# Patient Record
Sex: FEMALE | Race: WHITE
[De-identification: names, ages, dates, MRNs, and addresses within clinical notes are randomized per-mention and may not be internally consistent; named-entity substitution may affect disease eponyms.]

---

## 2017-11-29 NOTE — RAD
EXAM: MAMMO KANG SCREENING BILATERAL.



HISTORY: Screening.



COMPARISON: Breast mammogram from 2016 and 2015.



FINDINGS:



2-D and 3-D tomosynthesis mammograms were obtained of both breasts in the CC

and MLO projections. Computer-aided detection (CAD) was utilized.



Breast density category C: The breasts are heterogeneously dense which may

obscure small masses.

The nipples and skin are within normal limits. Stable bilateral round masses

are seen dating back to 2015.

No suspicious calcifications, spiculated masses or areas of architectural

distortion.



IMPRESSION: No mammographic evidence of malignancy. Stable mammogram.



BI-RADS CATEGORY: 2 BENIGN FINDING



RECOMMENDED FOLLOW-UP: 12M 12 MONTH FOLLOW-UP



PQRS compliance statement: Patient information was entered into a reminder

system with a target due date  11/29/2018 for the next mammogram.



Mammography is a sensitive method for finding small breast cancers, but it

does not detect them all and is not a substitute for careful clinical

examination.  A negative mammogram does not negate a clinically suspicious

finding and should not result in delay in biopsying a clinically suspicious

abnormality.



"Our facility is accredited by the American [employer] Mammography

Program."

## 2020-11-10 ENCOUNTER — HOSPITAL ENCOUNTER (OUTPATIENT)
Dept: HOSPITAL 63 - PMG | Age: 65
End: 2020-11-10
Attending: PHYSICIAN ASSISTANT
Payer: MEDICARE

## 2020-11-10 DIAGNOSIS — M25.762: ICD-10-CM

## 2020-11-10 DIAGNOSIS — M17.0: Primary | ICD-10-CM

## 2020-11-10 DIAGNOSIS — M25.761: ICD-10-CM

## 2020-11-10 PROCEDURE — 73560 X-RAY EXAM OF KNEE 1 OR 2: CPT

## 2020-11-10 NOTE — RAD
EXAM: KNEE BILAT 2V 11/10/2020 12:00 AM

 

CLINICAL INDICATION:Status post fall

 

COMPARISON:None

 

TECHNIQUE:Standing AP view of the knees and oblique and lateral view of 

the right and left knee

 

FINDINGS:

 

Right knee: No acute fracture. Alignment is normal. Joint spaces are 

maintained. There are small lateral and patellofemoral osteophytes. No 

joint effusion or soft tissue abnormality.

 

Left knee: No acute fracture. Alignment is normal. Joint spaces are 

maintained. There are tiny patellofemoral osteophytes. No joint effusion.

 

IMPRESSION:

1. No acute osseous abnormality.

 

2. Mild bilateral degenerative joint disease.

 

Electronically signed by: Angle Camara MD (11/10/2020 4:56 PM) [id number]

## 2020-11-17 ENCOUNTER — HOSPITAL ENCOUNTER (OUTPATIENT)
Dept: HOSPITAL 63 - LAB | Age: 65
End: 2020-11-17
Attending: PHYSICIAN ASSISTANT
Payer: MEDICARE

## 2020-11-17 DIAGNOSIS — U07.1: Primary | ICD-10-CM

## 2020-11-17 PROCEDURE — U0003 INFECTIOUS AGENT DETECTION BY NUCLEIC ACID (DNA OR RNA); SEVERE ACUTE RESPIRATORY SYNDROME CORONAVIRUS 2 (SARS-COV-2) (CORONAVIRUS DISEASE [COVID-19]), AMPLIFIED PROBE TECHNIQUE, MAKING USE OF HIGH THROUGHPUT TECHNOLOGIES AS DESCRIBED BY CMS-2020-01-R: HCPCS

## 2020-12-07 ENCOUNTER — HOSPITAL ENCOUNTER (OUTPATIENT)
Dept: HOSPITAL 63 - MAMMO | Age: 65
End: 2020-12-07
Attending: PHYSICIAN ASSISTANT
Payer: MEDICARE

## 2020-12-07 DIAGNOSIS — Z12.31: Primary | ICD-10-CM

## 2020-12-07 PROCEDURE — 77063 BREAST TOMOSYNTHESIS BI: CPT

## 2020-12-07 PROCEDURE — 77067 SCR MAMMO BI INCL CAD: CPT

## 2021-01-06 ENCOUNTER — HOSPITAL ENCOUNTER (OUTPATIENT)
Dept: HOSPITAL 63 - DXRAD | Age: 66
End: 2021-01-06
Attending: FAMILY MEDICINE
Payer: MEDICARE

## 2021-01-06 DIAGNOSIS — M89.9: ICD-10-CM

## 2021-01-06 DIAGNOSIS — M81.8: Primary | ICD-10-CM

## 2021-01-06 PROCEDURE — 77080 DXA BONE DENSITY AXIAL: CPT

## 2021-01-06 NOTE — RAD
EXAM: DUAL ENERGY X-RAY ABSORPTIOMETRY (DEXA).



HISTORY: Postmenopausal screening. Osteoporosis.



FINDINGS: The lowest measured T-score is -1.3 in the right hip, based on a bone mineral density of 0.
791 g/cm^2. Refer to the worksheets for full detail.



There has been a 0.5 percent decrease in density of the right hip and 8.2 percent increase in density
 of the lumbar spine compared to a study performed 10/30/2013.



IMPRESSION:

1. Low bone mass. Bone mineral density yields a T-score between -1.0 and -2.5. Fracture risk is incre
ased.

2. FRAX report: Not calculated.





METHODOLOGY: Dual energy x-ray absorptiometry was performed to measure bone mineral density. The foll
owing analysis is based on the 2019 Official Positions of the International Society for Clinical Dens
itometry: 



Measurements of the hips and the average of L1-L4 are preferred. When the spine and/or hip cannot be 
feasibly measured or interpreted, or in the setting of hyperparathyroidism, distal radial bone minera
l density may be measured. 



The lumbar spine T-score is based on the average bone mineral density of L1-L4. In the setting of art
ifact or anatomic abnormality, some lumbar levels may be excluded, and the remaining levels used for 
calculation. A single lumbar level is not used for diagnosis, and if only a single level is available
 for assessment, another anatomic site will be used to assign a diagnosis.



The hip T-score is based on the bone mineral density measurement of the femoral neck or total proxima
l femur of either side, whichever is lowest. Bilateral mean values are not used for diagnosis.



The forearm T-score is derived from 33% of the distal radius of the nondominant forearm.



Electronically signed by: Nita Jerry MD (1/6/2021 2:56 PM) [id number]

## 2021-06-29 ENCOUNTER — HOSPITAL ENCOUNTER (EMERGENCY)
Dept: HOSPITAL 63 - ER | Age: 66
Discharge: HOME | End: 2021-06-29
Payer: MEDICARE

## 2021-06-29 VITALS
SYSTOLIC BLOOD PRESSURE: 138 MMHG | SYSTOLIC BLOOD PRESSURE: 138 MMHG | SYSTOLIC BLOOD PRESSURE: 138 MMHG | DIASTOLIC BLOOD PRESSURE: 84 MMHG | DIASTOLIC BLOOD PRESSURE: 84 MMHG | DIASTOLIC BLOOD PRESSURE: 84 MMHG

## 2021-06-29 VITALS — BODY MASS INDEX: 24.59 KG/M2 | WEIGHT: 162.26 LBS | HEIGHT: 68 IN

## 2021-06-29 DIAGNOSIS — I10: ICD-10-CM

## 2021-06-29 DIAGNOSIS — E78.00: ICD-10-CM

## 2021-06-29 DIAGNOSIS — R42: Primary | ICD-10-CM

## 2021-06-29 DIAGNOSIS — Z88.5: ICD-10-CM

## 2021-06-29 DIAGNOSIS — R51.9: ICD-10-CM

## 2021-06-29 LAB
ALBUMIN SERPL-MCNC: 4.2 G/DL (ref 3.4–5)
ALBUMIN/GLOB SERPL: 1.2 {RATIO} (ref 1–1.7)
ALP SERPL-CCNC: 63 U/L (ref 46–116)
ALT SERPL-CCNC: 34 U/L (ref 14–59)
ANION GAP SERPL CALC-SCNC: 10 MMOL/L (ref 6–14)
APTT PPP: YELLOW S
AST SERPL-CCNC: 20 U/L (ref 15–37)
BACTERIA #/AREA URNS HPF: 0 /HPF
BASOPHILS # BLD AUTO: 0 X10^3/UL (ref 0–0.2)
BASOPHILS NFR BLD: 1 % (ref 0–3)
BILIRUB SERPL-MCNC: 0.3 MG/DL (ref 0.2–1)
BILIRUB UR QL STRIP: (no result)
BUN/CREAT SERPL: 24 (ref 6–20)
CA-I SERPL ISE-MCNC: 19 MG/DL (ref 7–20)
CALCIUM SERPL-MCNC: 9.7 MG/DL (ref 8.5–10.1)
CHLORIDE SERPL-SCNC: 106 MMOL/L (ref 98–107)
CO2 SERPL-SCNC: 26 MMOL/L (ref 21–32)
CREAT SERPL-MCNC: 0.8 MG/DL (ref 0.6–1)
EOSINOPHIL NFR BLD: 0.1 X10^3/UL (ref 0–0.7)
EOSINOPHIL NFR BLD: 1 % (ref 0–3)
ERYTHROCYTE [DISTWIDTH] IN BLOOD BY AUTOMATED COUNT: 13.5 % (ref 11.5–14.5)
FIBRINOGEN PPP-MCNC: CLEAR MG/DL
GFR SERPLBLD BASED ON 1.73 SQ M-ARVRAT: 72 ML/MIN
GLOBULIN SER-MCNC: 3.5 G/DL (ref 2.2–3.8)
GLUCOSE SERPL-MCNC: 99 MG/DL (ref 70–99)
GLUCOSE UR STRIP-MCNC: (no result) MG/DL
HCT VFR BLD CALC: 38.5 % (ref 36–47)
HGB BLD-MCNC: 13 G/DL (ref 12–15.5)
LYMPHOCYTES # BLD: 1.3 X10^3/UL (ref 1–4.8)
LYMPHOCYTES NFR BLD AUTO: 17 % (ref 24–48)
MCH RBC QN AUTO: 32 PG (ref 25–35)
MCHC RBC AUTO-ENTMCNC: 34 G/DL (ref 31–37)
MCV RBC AUTO: 96 FL (ref 79–100)
MONO #: 0.5 X10^3/UL (ref 0–1.1)
MONOCYTES NFR BLD: 6 % (ref 0–9)
NEUT #: 5.7 X10^3UL (ref 1.8–7.7)
NEUTROPHILS NFR BLD AUTO: 76 % (ref 31–73)
NITRITE UR QL STRIP: (no result)
PLATELET # BLD AUTO: 317 X10^3/UL (ref 140–400)
POTASSIUM SERPL-SCNC: 4.3 MMOL/L (ref 3.5–5.1)
PROT SERPL-MCNC: 7.7 G/DL (ref 6.4–8.2)
RBC # BLD AUTO: 4.01 X10^6/UL (ref 3.5–5.4)
RBC #/AREA URNS HPF: 0 /HPF (ref 0–2)
SODIUM SERPL-SCNC: 142 MMOL/L (ref 136–145)
SP GR UR STRIP: 1.01
SQUAMOUS #/AREA URNS LPF: (no result) /LPF
UROBILINOGEN UR-MCNC: 0.2 MG/DL
WBC # BLD AUTO: 7.6 X10^3/UL (ref 4–11)
WBC #/AREA URNS HPF: (no result) /HPF (ref 0–4)

## 2021-06-29 PROCEDURE — 96360 HYDRATION IV INFUSION INIT: CPT

## 2021-06-29 PROCEDURE — 99285 EMERGENCY DEPT VISIT HI MDM: CPT

## 2021-06-29 PROCEDURE — 36415 COLL VENOUS BLD VENIPUNCTURE: CPT

## 2021-06-29 PROCEDURE — 85025 COMPLETE CBC W/AUTO DIFF WBC: CPT

## 2021-06-29 PROCEDURE — 81001 URINALYSIS AUTO W/SCOPE: CPT

## 2021-06-29 PROCEDURE — 70450 CT HEAD/BRAIN W/O DYE: CPT

## 2021-06-29 PROCEDURE — 93005 ELECTROCARDIOGRAM TRACING: CPT

## 2021-06-29 PROCEDURE — 80053 COMPREHEN METABOLIC PANEL: CPT

## 2021-06-29 NOTE — RAD
EXAM: CT head without contrast



INDICATION: Dizziness



COMPARISON: None



TECHNIQUE: Axial CT imaging through the head without intravenous contrast.



One or more of the following individualized dose reduction techniques were utilized for this examinat
ion:  



1. Automated exposure control

2. Adjustment of the mA and/or kV according to patient size

3. Use of iterative reconstruction technique.



FINDINGS:



The ventricles and sulci are normal. Grey-white matter differentiation is maintained. There is no int
racranial hemorrhage, acute infarct, or mass lesion. Basal cisterns are clear. The skull and scalp ar
e intact. Paranasal sinuses and mastoid air cells are clear. Globes and orbits are intact.



IMPRESSION: No acute intracranial abnormality.



Electronically signed by: Angle Camara MD (6/29/2021 3:03 PM) [id number]

## 2021-06-29 NOTE — EKG
[facility] [address], [place] [address]

Test Date:    2021               Test Time:    14:14:20

Pat Name:     LEAANN WEILBACHER        Department:   

Patient ID:   [id number]           Room:          

Gender:       F                        Technician:   JOSE

:          1955               Requested By: VERITO MARTE

Order Number: [id number]            Reading MD:     

                                 Measurements

Intervals                              Axis          

Rate:         83                       P:            41

OK:           192                      QRS:          25

QRSD:         136                      T:            128

QT:           416                                    

QTc:          495                                    

                           Interpretive Statements

SINUS RHYTHM

ATRIAL PREMATURE COMPLEX(ES)

LEFT BUNDLE BRANCH BLOCK

ABNORMAL ECG

RI6.02

No previous ECG available for comparison

## 2021-06-29 NOTE — PHYS DOC
Past History


Past Medical History:  High Cholesterol, Hypertension


Past Surgical History:  Other


Additional Past Surgical Histo:  hernia repair; varicose vein repair in bilat 

legs


Alcohol Use:  None





General Adult


EDM:


Chief Complaint:  DIZZY/LIGHT HEADED





HPI:


HPI:





Patient is a 65-year-old female presents with dizziness since Saturday.  Patient

states "I just feel kind of floaty in my head".  "I kind of felt like I have a 

buzz".  Patient states that normally symptoms resolve when she sits down today 

they did not.  Patient is also reporting a headache and states that pain is 

worse with lying down.  Denies taking anything for headache prior to arrival.  

Denies chest pain or shortness of breath.  Denies nausea/vomiting/diarrhea.  

Denies trouble with balance.  Neuro exam is negative.  Patient has history of 

hypertension, high cholesterol





Review of Systems:


Review of Systems:


Constitutional:  Denies fever or chills 


Eyes:  Denies change in visual acuity 


HENT:  Denies nasal congestion or sore throat 


Respiratory:  Denies cough or shortness of breath 


Cardiovascular:  Denies chest pain or edema 


GI:  Denies abdominal pain, nausea, vomiting, bloody stools or diarrhea 


: Denies dysuria 


Musculoskeletal:  Denies back pain or joint pain 


Integument:  Denies rash 


Neurologic: Reports headache, dizziness.  Denies focal weakness or sensory 

changes 


Endocrine:  Denies polyuria or polydipsia 


Lymphatic:  Denies swollen glands 


Psychiatric:  Denies depression or anxiety





Allergies:


Allergies:





Allergies








Coded Allergies Type Severity Reaction Last Updated Verified


 


  hydrocodone Allergy Mild n/V 9/5/19 Yes











Physical Exam:


PE:





Constitutional: Well developed, well nourished, no acute distress, non-toxic 

appearance. []


HENT: Normocephalic, atraumatic, bilateral external ears normal, oropharynx 

moist, no oral exudates, nose normal. []


Eyes: PERRLA, EOMI, conjunctiva normal, no discharge. [] 


Neck: Normal range of motion, no tenderness, supple, no stridor. [] 


Cardiovascular:Heart rate regular rhythm, no murmur []


Lungs & Thorax:  Bilateral breath sounds clear to auscultation []


Abdomen: Bowel sounds normal, soft, no tenderness, no masses, no pulsatile 

masses. [] 


Skin: Warm, dry, no erythema, no rash. [] 


Back: No tenderness, no CVA tenderness. [] 


Extremities: No tenderness, no cyanosis, no clubbing, ROM intact, no edema. [] 


Neurologic: Alert and oriented X 3, normal motor function, normal sensory 

function, no focal deficits noted. []


Psychologic: Affect normal, judgement normal, mood normal. []





Current Patient Data:


Vital Signs:





                                   Vital Signs








  Date Time  Temp Pulse Resp B/P (MAP) Pulse Ox O2 Delivery O2 Flow Rate FiO2


 


6/29/21 14:03  80 16 138/84 (102) 97   











Radiology/Procedures:


Radiology/Procedures:


[]EXAM: CT head without contrast





INDICATION: Dizziness





COMPARISON: None





TECHNIQUE: Axial CT imaging through the head without intravenous contrast.





One or more of the following individualized dose reduction techniques were 

utilized for this examination:  





1. Automated exposure control


2. Adjustment of the mA and/or kV according to patient size


3. Use of iterative reconstruction technique.





FINDINGS:





The ventricles and sulci are normal. Grey-white matter differentiation is 

maintained. There is no intracranial hemorrhage, acute infarct, or mass lesion. 

Basal cisterns are clear. The skull and scalp are intact. Paranasal sinuses and 

mastoid air cells are clear. Globes and orbits are intact.





IMPRESSION: No acute intracranial abnormality.





Electronically signed by: Angle Camara MD (6/29/2021 3:03 PM) [id number]





Heart Score:


C/O Chest Pain:  No


Risk Factors:


Risk Factors:  DM, Current or recent (<one month) smoker, HTN, HLP, family 

history of CAD, obesity.


Risk Scores:


Score 0 - 3:  2.5% MACE over next 6 weeks - Discharge Home


Score 4 - 6:  20.3% MACE over next 6 weeks - Admit for Clinical Observation


Score 7 - 10:  72.7% MACE over next 6 weeks - Early Invasive Strategies





Course & Med Decision Making:


Course & Med Decision Making


Pertinent Labs and Imaging studies reviewed. (See chart for details)





[] 65-year-old female presents with dizziness and headache since Saturday.  

Patient was seen at urgent care prior to coming into the emergency room.  

Patient denies chest pain or shortness of breath.  Neuro exam is negative.  CT 

of head was negative for intracranial hemorrhage, acute infarct, or mass lesion.

  All labs are unremarkable.  Patient given meclizine to help with dizziness.  

Instructed patient to follow-up with PCP if dizziness continues.  Patient given 

strict return precautions.  Patient is hemodynamically stable.  Patient is 

appreciative and okay with discharge plan.





Dragon Disclaimer:


Dragon Disclaimer:


This electronic medical record was generated, in whole or in part, using a voice

 recognition dictation system.





Departure


Departure:


Impression:  


   Primary Impression:  


   Dizziness


Disposition:  01 HOME / SELF CARE / HOMELESS


Condition:  STABLE


Referrals:  


NEGAR STARK MD (PCP)


Patient Instructions:  Dizziness, Easy-to-Read





Additional Instructions:  


You are seen emergency room for dizziness.  All of your labs were unremarkable. 

 Your EKG was negative for cardiac abnormalities.  Please follow-up with your 

PCP if dizziness continues.  Please return the emergency room if you have 

worsening symptoms or concerns.





EMERGENCY DEPARTMENT GENERAL DISCHARGE INSTRUCTIONS





Thank you for coming to [facility] (ED) today and trusting us

 with you 


care.  We trust that you had a positivie experience in our Emergency Department.

  If you 


wish to speak to the department management, you may call the director at 

[phone].





YOUR FOLLOW UP INSTRUCTIONS ARE AS FOLLOWS:





1.  Do you have a private Doctor?  If you do not have a private doctor, please 

ask for a 


resource list of physicians or clinics that may be able to assist you with 

follow up care.





2.  The Emergency Physician has interpreted your x-rays.  The X-Ray specialist 

will also 


review them.  If there is a change in the findings, you will be notified in 48 

hours when at 


all possible.





3.  A lab test or culture has been done, your results will be reviewed and you 

will be 


notified if you need a change in treatment.





ADDITIONAL INSTRUCTIONS AND INFORMATION:





1.  Your care today has been supervised by a physician who is specially trained 

in emergency 


care.  Many problems require more than one evaluation for a complete diagnosis 

and 


treatment.  We recommend that you schedule your follow up appointment as recomm

ended to 


ensure complete treatment of you illness or injury.  If you are unable to obtain

 follow up 


care and continue to have a problem, or if your condition worsens, we recommend 

that you 


return to the ED.





2.  We are not able to safely determine your condition over the phone nor are we

 able to 


give sound medical advice over the phone.  For these safety reasons, if you call

 for medical 


advice we will ask you to come to the ED for further evaluation.





3.  If you have any questions regarding these discharge instructions please call

 the ED at 


[phone].





SAFETY INFORMATION:





In the interest of safety, wellness, and injury prevention; we encourage you to 

wear your 


sealbelt, if you smoke; quite smoking, and we encourage family to use a 

protective helmet 


for bicycling and other sporting events that present an increased risk for head 

injury.





IF YOUR SYMPTOMS WORSEN OR NEW SYMPTOMS DEVELOP, OR YOU HAVE CONCERNS ABOUT YOUR

 CONDITION; 


OR IF YOUR CONDITION WORSENS WHILE YOU ARE WAITING FOR YOUR FOLLOW UP 

APPOINTMENT; EITHER 


CONTACT YOUR PRIMARY CARE DOCTOR, THE PHYSICIAN WHOSE NAME AND NUMBER YOU WERE 

GIVEN, OR 


RETURN TO THE ED IMMEDIATELY.











VERITO MARTE APRDALTON               Jun 29, 2021 14:37

## 2022-02-07 ENCOUNTER — HOSPITAL ENCOUNTER (OUTPATIENT)
Dept: HOSPITAL 63 - MAMMO | Age: 67
End: 2022-02-07
Attending: FAMILY MEDICINE
Payer: MEDICARE

## 2022-02-07 DIAGNOSIS — Z12.31: Primary | ICD-10-CM

## 2022-02-07 PROCEDURE — 77067 SCR MAMMO BI INCL CAD: CPT

## 2022-02-07 PROCEDURE — 77063 BREAST TOMOSYNTHESIS BI: CPT

## 2022-02-07 NOTE — RAD
Bilateral digital screening 2-D and 3-D (tomosynthesis) mammogram:



Reason for examination: Routine screening.



Comparison is made to previous study dated

 

Bilateral mammograms in CC and oblique projections were obtained with 2-D imaging and 3-D tomosynthes
is imaging and reviewed on the workstation.

Interpretation was made with the benefit of CAD.



Findings:

Breast density: Category C. The breasts are heterogeneously dense, which may obscure small masses.



There are no suspicious masses, malignant appearing calcifications or architectural distortion. There
 are nipple small bilateral oval circumscribed masses. The largest measures 9 mm and is in the 11:30 
position of the right breast approximately 2 cm from the nipple. 



Impression:

No evidence of malignancy.



ASSESSMENT: BI-RADS 2. Benign findings.



Recommendations: Routine screening mammograms.



This patient's information has been entered into a reminder system for the patient to be notified wit
h the results of her examination and a target date for the next mammogram.



Your patient's mammogram demonstrates that she has dense breast tissue (breast density category C or 
D), which could hide abnormalities, and if she has other risk factors for breast cancer that have bee
n identified, she might benefit from supplemental screening tests that may be suggested by you as her
 ordering physician. Dense breast tissue, in and of itself, is a relatively common condition. Therefo
re, this information is not provided to cause undue concern, but rather to raise your awareness and t
o promote discussion with your patient regarding the presence of other risk factors, in addition to d
ense breast tissue. 



Electronically signed by: Estefani Davila MD (2/7/2022 5:28 PM) [id number]

## 2022-04-25 ENCOUNTER — HOSPITAL ENCOUNTER (OUTPATIENT)
Dept: HOSPITAL 63 - RAD | Age: 67
End: 2022-04-25
Attending: PHYSICIAN ASSISTANT
Payer: MEDICARE

## 2022-04-25 DIAGNOSIS — R07.9: Primary | ICD-10-CM

## 2022-04-25 PROCEDURE — 71046 X-RAY EXAM CHEST 2 VIEWS: CPT

## 2023-12-06 NOTE — RAD
BILATERAL SCREENING MAMMOGRAM, 3-D

 

History: Routine screening.

 

Comparison:  11/22/2016, 11/29/2017, 12/4/2018, 12/5/2019. 

 

Technique:  MLO and CC digital tomosynthesis (3D) images obtained. 

Radiologist reviewed these images on dedicated workstation.

 

Findings: 

Breast Tissue Density C : The breasts are heterogeneously dense, which may

obscure small masses.

 

There are no dominant masses, suspicious microcalcifications, or 

architectural distortion.

 

IMPRESSION:

No mammographic evidence of malignancy. Recommend routine screening.

 

BI-RADS category 1:  Negative.

 

The images were reviewed with computer-aided detection.

 

Patient information is entered into reminder system with a target due date

for the next screening mammogram.

 

Mammography is the most sensitive method for finding small breast cancers,

but it does not detect them all and is not a substitute for careful 

clinical examination. A negative mammogram does not negate a clinically 

suspicious finding and should not result in delay in biopsying a 

clinically suspicious abnormality.

 

 "Our facility is accredited by the American College of [employer] 

Mammography Program."

 

Electronically signed by: Jese Au MD (12/7/2020 4:31 PM) [id number]
110.2